# Patient Record
Sex: MALE | Race: OTHER | HISPANIC OR LATINO | URBAN - METROPOLITAN AREA
[De-identification: names, ages, dates, MRNs, and addresses within clinical notes are randomized per-mention and may not be internally consistent; named-entity substitution may affect disease eponyms.]

---

## 2022-03-24 ENCOUNTER — EMERGENCY (EMERGENCY)
Facility: HOSPITAL | Age: 22
LOS: 0 days | Discharge: HOME | End: 2022-03-24
Attending: EMERGENCY MEDICINE | Admitting: EMERGENCY MEDICINE
Payer: MEDICAID

## 2022-03-24 VITALS
HEIGHT: 70 IN | DIASTOLIC BLOOD PRESSURE: 73 MMHG | WEIGHT: 229.94 LBS | HEART RATE: 77 BPM | OXYGEN SATURATION: 100 % | SYSTOLIC BLOOD PRESSURE: 132 MMHG | RESPIRATION RATE: 18 BRPM | TEMPERATURE: 98 F

## 2022-03-24 DIAGNOSIS — M25.562 PAIN IN LEFT KNEE: ICD-10-CM
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
## 2022-03-24 DIAGNOSIS — F17.210 NICOTINE DEPENDENCE, CIGARETTES, UNCOMPLICATED: ICD-10-CM
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
## 2022-03-24 DIAGNOSIS — S83.92XA SPRAIN OF UNSPECIFIED SITE OF LEFT KNEE, INITIAL ENCOUNTER: ICD-10-CM
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
## 2022-03-24 PROCEDURE — 73562 X-RAY EXAM OF KNEE 3: CPT | Mod: 26,LT

## 2022-03-24 PROCEDURE — 99283 EMERGENCY DEPT VISIT LOW MDM: CPT

## 2022-03-24 RX ORDER — IBUPROFEN 200 MG
600 TABLET ORAL ONCE
Refills: 0 | Status: DISCONTINUED | OUTPATIENT
Start: 2022-03-24 | End: 2022-03-24

## 2022-03-24 NOTE — ED PROVIDER NOTE - PATIENT PORTAL LINK FT
You can access the FollowMyHealth Patient Portal offered by [facility] by registering at the following website: [email or web address]. By joining [employer]’s FollowMyHealth portal, you will also be able to view your health information using other applications (apps) compatible with our system.

## 2022-03-24 NOTE — ED ADULT TRIAGE NOTE - CHIEF COMPLAINT QUOTE
ANAID with complaints of left knee pain after slipped and fell while working. ANAID with complaints of left knee pain after slipped and fell while at work.

## 2022-03-24 NOTE — ED PROVIDER NOTE - OBJECTIVE STATEMENT
22 year old male, healthy, no reported medical problems, BIBEMS for left knee injury and pain following a mechanical fall. 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 He heard a cracking sound and subsequently could not bear weight on the left LE. Reports pain upon repositioning left knee and when touching the affected area. Pain is most severe on lateral patella. Denies any chills, fever, shortness of breath, chest pain, weakness, LOC, dizziness, blurry vision. Reports icing the knee per EMS is helping somewhat.

## 2022-03-24 NOTE — ED PROVIDER NOTE - NSFOLLOWUPINSTRUCTIONS_ED_ALL_ED_FT
Follow up with your primary doctor.     Knee Sprain       A knee sprain is a stretch or tear in a knee ligament. Knee ligaments are tissues that connect bones in the knee to each other.      What are the causes?    This condition often results from:  •A fall.   •An injury to the knee.        What are the signs or symptoms?    Symptoms of this condition include:  •Trouble straightening or bending the leg.   •Swelling in the knee.   •Bruising around the knee.   •Tenderness or pain in the knee.   •Sudden muscle tightening (spasms) around the knee.        How is this treated?    Treatment for this condition may involve:  •Keeping the knee still (immobilized) with a cast, brace, or splint.  •Putting ice on the knee. This helps with pain and swelling.  •Raising (elevating) the knee above the level of your heart when you are resting. This helps with pain and swelling.  •Taking medicine for pain.   •Doing exercises to keep your knee from being weak or stiff.  •Having surgery. This may be needed if the ligament is completely torn.        Follow these instructions at home:    If you have a splint or brace:     •Wear it as told by your doctor. Remove it only as told by your doctor.  •Check the skin around it every day. Tell your doctor about any concerns.  •Loosen it if your toes:  •Tingle.  •Become numb.  •Turn cold and blue.  •Keep it clean and dry.      If you have a cast:     • Do not stick anything inside it to scratch your skin.  •Check the skin around it every day. Tell your doctor about any concerns.  •You may put lotion on dry skin around the edges of the cast. Do not put lotion on the skin under the cast.  •Keep it clean and dry.      Bathing     If you have a splint, brace, or cast that is not waterproof:  •Do not let it get wet.  •Cover it with a watertight covering when you take a bath or a shower.        Managing pain, stiffness, and swelling  •If told, put ice on the injured area. To do this:  •If you have a removable splint or brace, remove it as told by your doctor.  •Put ice in a plastic bag.  •Place a towel between your skin and the bag or between your cast and the bag.  •Leave the ice on for 20 minutes, 2–3 times a day.  •Move your toes often to reduce stiffness and swelling.  •Raise the injured area above the level of your heart while you are sitting or lying down.      General instructions    •Take over-the-counter and prescription medicines only as told by your doctor.  •Do not use any products that contain nicotine or tobacco, such as cigarettes, e-cigarettes, and chewing tobacco. These can delay healing. If you need help quitting, ask your doctor.  •Do exercises as told by your doctor.  •Keep all follow-up visits as told by your doctor. This is important.        Contact a doctor if:    •Your pain gets worse.  •The cast, brace, or splint does not fit right.  •The cast, brace, or splint gets damaged.      Get help right away if:    •You cannot use your knee to support your body weight (bear weight) for standing or walking.  •You cannot move the injured area.  •Your knee stephanie or you have pain after you walk only a few steps.  •You have very bad pain, swelling, or numbness in your leg below the cast, brace, or splint.  •Your foot or toes are numb, cold, or blue after loosening your splint or brace.    Summary    •A knee sprain is a stretch or tear in a tissue (ligament) that connects your knee bones to each other.  •You may need to wear a splint, brace, or cast to keep the knee still while it is getting better.  •Surgery may be needed if the ligament is completely torn.      This information is not intended to replace advice given to you by your health care provider. Make sure you discuss any questions you have with your health care provider.

## 2022-03-24 NOTE — ED PROVIDER NOTE - ATTENDING CONTRIBUTION TO CARE
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  Reports hearing a "cracking" sound on impact and now has sig pain to L knee (ric lateral to kneecap) and difficulty bearing weight/ambulating.  Did ice his knee en route, which gave him some relief.  No a/c use.  No head injury.    well appearing young male in NAD  mild tenderness at L knee, +associated swelling and dec ROM 2/2 pain and swelling  intact distal sensation/perfusion (2+ DP and PT pulse)

## 2022-03-24 NOTE — ED PROVIDER NOTE - CLINICAL SUMMARY MEDICAL DECISION MAKING FREE TEXT BOX
22 year old male, healthy, no reported medical problems, BIBEMS for left knee injury and pain following a mechanical fall. 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 He heard a cracking sound and subsequently could not bear weight on the left LE.     #  - XR knee   - Motrin for pain 22 year old male, healthy, no reported medical problems, BIBEMS for left knee injury and pain following a mechanical fall. 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 He heard a cracking sound and subsequently could not bear weight on the left LE.     #  - XR knee   - Motrin, ice for pain  - F/u with PMD 22 year old male, healthy, no reported medical problems, BIBEMS for left knee injury and pain following a mechanical fall. 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 He heard a cracking sound and subsequently could not bear weight on the left LE.     #Knee sprain  - XR left knee negative for fracture  - Knee immobilizer placed  - Recommended continuing Motrin, ice for pain  - F/u with PMD  - V/S and physical exam stable for discharge; pt agreeable to plan.

## 2022-03-24 NOTE — ED PROVIDER NOTE - PROGRESS NOTE DETAILS
Motrin administered and XR knee obtained. Will continue to assess. Motrin administered and XR left knee obtained; no evidence of osseous abnormality, including fracture. Pt's pain responsive to PO Motrin and ice. Knee immobilizer placed; pt instructed to apply ice and f/u with PMD. Agreeable with plan.

## 2022-03-24 NOTE — ED PROVIDER NOTE - SKIN, MLM
Skin normal color for race, warm, dry and intact. No evidence of rash. +Ecchymosis of Left lateral patella.

## 2022-04-06 NOTE — ED PROVIDER NOTE - CROS ED MUSC ALL NEG
[Lower back] : lower back [7] : 7 [1] : 2 [Dull/Aching] : dull/aching [Constant] : constant [Work] : work [Meds] : meds [FreeTextEntry1] : 4/6/22- Patient here for follow up to MBB #2.  He had at least 80% relief.  Will schedule B/l RFA.\par \par 2/9/22- Patient here for follow up. Had MBB #1 and had great relief.\par 1/12/22: follow up today. since last visit he did not have repeat injection due to the inability to get a covid test. Pain\par radiates to the back of the right leg to the knee. Pain worse in the morning. advised him not to wear the brace all day.\par 12/1/21: follow up today after LESI L4/5 on 11/8/21. He got great relief for 3 weeks. He had severe pain and was\par unhappy that he was prescribed Flexeril. He takes Valium regularly and was looking for something stronger.\par 10/20/21: follow up today after LESI L4/5 on 9/27/21. Had 80% relief from LESI. Will repeat for cumulative effect.\par 9/8/21: Patient presents for initial evaluation. he complains of pain in the lower back with radiation to the right posterior\par leg. pain started in January without instigation. no n/t. no weakness. He went to PT for 4 weeks with limited relief. [] : no [FreeTextEntry7] : Right leg  [de-identified] : Reaching high places  - - -